# Patient Record
Sex: FEMALE | Race: WHITE | NOT HISPANIC OR LATINO | Employment: FULL TIME | URBAN - METROPOLITAN AREA
[De-identification: names, ages, dates, MRNs, and addresses within clinical notes are randomized per-mention and may not be internally consistent; named-entity substitution may affect disease eponyms.]

---

## 2018-06-20 ENCOUNTER — NON-PROVIDER VISIT (OUTPATIENT)
Dept: URGENT CARE | Facility: PHYSICIAN GROUP | Age: 23
End: 2018-06-20

## 2018-06-20 DIAGNOSIS — Z11.1 PPD SCREENING TEST: Primary | ICD-10-CM

## 2018-06-20 PROCEDURE — 86580 TB INTRADERMAL TEST: CPT | Performed by: INTERNAL MEDICINE

## 2018-06-22 ENCOUNTER — NON-PROVIDER VISIT (OUTPATIENT)
Dept: URGENT CARE | Facility: PHYSICIAN GROUP | Age: 23
End: 2018-06-22

## 2018-06-22 LAB — TB WHEAL 3D P 5 TU DIAM: 0 MM

## 2019-01-04 ENCOUNTER — TELEPHONE (OUTPATIENT)
Dept: SCHEDULING | Facility: IMAGING CENTER | Age: 24
End: 2019-01-04

## 2019-01-24 ENCOUNTER — OFFICE VISIT (OUTPATIENT)
Dept: MEDICAL GROUP | Facility: MEDICAL CENTER | Age: 24
End: 2019-01-24
Attending: FAMILY MEDICINE
Payer: COMMERCIAL

## 2019-01-24 VITALS
HEART RATE: 92 BPM | TEMPERATURE: 99.7 F | DIASTOLIC BLOOD PRESSURE: 80 MMHG | HEIGHT: 64 IN | WEIGHT: 191 LBS | RESPIRATION RATE: 16 BRPM | OXYGEN SATURATION: 99 % | SYSTOLIC BLOOD PRESSURE: 120 MMHG | BODY MASS INDEX: 32.61 KG/M2

## 2019-01-24 DIAGNOSIS — E28.2 PCOS (POLYCYSTIC OVARIAN SYNDROME): ICD-10-CM

## 2019-01-24 DIAGNOSIS — N80.9 ENDOMETRIOSIS: ICD-10-CM

## 2019-01-24 DIAGNOSIS — E66.9 OBESITY (BMI 30-39.9): ICD-10-CM

## 2019-01-24 DIAGNOSIS — J45.20 MILD INTERMITTENT ASTHMA WITHOUT COMPLICATION: ICD-10-CM

## 2019-01-24 DIAGNOSIS — Z23 NEED FOR VACCINATION: ICD-10-CM

## 2019-01-24 PROCEDURE — 99202 OFFICE O/P NEW SF 15 MIN: CPT | Mod: 25 | Performed by: FAMILY MEDICINE

## 2019-01-24 PROCEDURE — 99204 OFFICE O/P NEW MOD 45 MIN: CPT | Performed by: FAMILY MEDICINE

## 2019-01-24 PROCEDURE — 90686 IIV4 VACC NO PRSV 0.5 ML IM: CPT | Performed by: FAMILY MEDICINE

## 2019-01-24 RX ORDER — ALBUTEROL SULFATE 90 UG/1
2 AEROSOL, METERED RESPIRATORY (INHALATION) EVERY 6 HOURS PRN
Qty: 8.5 G | Refills: 2 | Status: SHIPPED | OUTPATIENT
Start: 2019-01-24

## 2019-01-24 RX ORDER — ALBUTEROL SULFATE 90 UG/1
2 AEROSOL, METERED RESPIRATORY (INHALATION) EVERY 6 HOURS PRN
COMMUNITY
End: 2019-01-24 | Stop reason: SDUPTHER

## 2019-01-24 ASSESSMENT — PATIENT HEALTH QUESTIONNAIRE - PHQ9: CLINICAL INTERPRETATION OF PHQ2 SCORE: 0

## 2019-01-24 ASSESSMENT — PAIN SCALES - GENERAL: PAINLEVEL: NO PAIN

## 2019-01-24 NOTE — PROGRESS NOTES
Chief Complaint   Patient presents with   • New Patient   • Contraception         HISTORY OF THE PRESENT ILLNESS: Patient is a 23 y.o. female. This pleasant patient is here today to establish care and discuss the following problems      PCOS (polycystic ovarian syndrome)  New patient presents with history of being diagnosed with PCO S via imaging at age 13.  She has not experienced recent issues with acne.  She is mildly obese but has recently lost 12 pounds through limiting portions.    Endometriosis  The patient presents with history of endometriosis diagnosed by Dr. Ramirez based on pelvic pain during intercourse and clinical exam.  She has not had any pelviscopy.  She was managed with Seasonique where she was advised to skip the placebo weeks and went 3 years with no.  Whatsoever.  She found that when she was doing that is significantly increased feelings of depression and anxiety and fatigue.  She has now been off the Seasonique for the past 3 years and doing much better.  She reports that occasionally she will have may be a down day perhaps once a month but no sustained issues with depression or anxiety.  She reports that she does have very heavy menses lasting about 7 days he is occur every 4 weeks.  For current contraception they are using the pullout method.  She has never been pregnant.    Obesity (BMI 30-39.9)  Patient reports that thyroid labs have been normal in the past.  She denies cold intolerance or unexplained hair loss currently  3.  Mild intermittent asthma   characterized by wheezing typically only if she has a severe respiratory infection.  No recent emergency room visits.  She denies daily cough or sputum production.  She does not smoke  Mild asthma-patient reports that his  Social history-single, lives with her long-term boyfriend.  Works as a independent  for [employer]  Allergies: Cecrossy [cefaclor]    Current Outpatient Prescriptions Ordered in [employer]   Medication Sig Dispense  Refill   • albuterol (VENTOLIN HFA) 108 (90 Base) MCG/ACT Aero Soln inhalation aerosol Inhale 2 Puffs by mouth every 6 hours as needed for Shortness of Breath. 8.5 g 2   • Levonorgest-Eth Estrad 91-Day (SEASONIQUE PO) Take 1 Cap by mouth every day.     • fluticasone (FLONASE) 50 MCG/ACT nasal spray Spray 1 Spray in nose every day.     • therapeutic multivitamin-minerals (THERAGRAN-M) TABS Take 1 Tab by mouth every day.       No current [facility]-ordered facility-administered medications on file.        Past Medical History:   Diagnosis Date   • Anesthesia     PONV   • Anxiety    • Asthma     MILD   • Endometriosis    • PCOS (polycystic ovarian syndrome)        Past Surgical History:   Procedure Laterality Date   • MAMMOPLASTY REDUCTION  1/7/2015    Performed by Heydi Dickson M.D. at [facility]   • OTHER ORTHOPEDIC SURGERY  2011    RIGHT KNEE SCOPE-plica   • OTHER  1997    T&A   • TONSILLECTOMY         Social History   Substance Use Topics   • Smoking status: Never Smoker   • Smokeless tobacco: Never Used   • Alcohol use Yes      Comment: 1x /week       Family Status   Relation Status   • Bro (Not Specified)   • MUnc (Not Specified)   • MGFa (Not Specified)   • PGFa (Not Specified)   • Neg Hx (Not Specified)     Family History   Problem Relation Age of Onset   • Cancer Brother         Testicular   • Heart Disease Maternal Uncle    • Diabetes Maternal Grandfather    • Heart Disease Maternal Grandfather    • Cancer Paternal Grandfather         Leukemia   • Stroke Neg Hx        Review of Systems   Constitutional: Negative for fever, chills, weight loss and malaise/fatigue.   HENT: Negative for ear pain, nosebleeds, congestion, sore throat and neck pain.    Eyes: Negative for blurred vision.   Respiratory: Negative for cough, sputum production, shortness of breath and wheezing.    Cardiovascular: Negative for chest pain, palpitations, orthopnea and leg swelling.   Gastrointestinal: Negative for heartburn,  "nausea, vomiting and abdominal pain.   Genitourinary: Negative for dysuria, urgency and frequency.   Musculoskeletal: Negative for myalgias, back pain and joint pain.   Skin: Negative for rash and itching.   Neurological: Negative for dizziness, tingling, tremors, sensory change, focal weakness and headaches.   Endo/Heme/Allergies: Does not bruise/bleed easily.   Psychiatric/Behavioral: Negative for depression, anxiety, or memory loss.            Exam: Blood pressure 120/80, pulse 92, temperature 37.6 °C (99.7 °F), temperature source Temporal, resp. rate 16, height 1.626 m (5' 4.02\"), weight 86.6 kg (191 lb), last menstrual period 01/03/2019, SpO2 99 %.  General: Normal appearing. No distress.  HEENT: Normocephalic. Eyes conjunctiva clear lids without ptosis, pupils equal and reactive to light accommodation, ears normal shape and contour, canals are clear bilaterally, tympanic membranes are benign, nasal mucosa benign, oropharynx is without erythema, edema or exudates.   Neck: Supple without JVD or bruit. Thyroid is not enlarged.  Pulmonary: Clear to ausculation.  Normal effort. No rales, ronchi, or wheezing.  Cardiovascular: Regular rate and rhythm without murmur. Carotid and radial pulses are intact and equal bilaterally.  Abdomen: Soft, nontender, nondistended. Normal bowel sounds. Liver and spleen are not palpable  Neurologic: Intact light touch and strength bilaterally,normal speech, no tremor, normal gait.   Lymph: No cervical, supraclavicular or axillary lymph nodes are palpable  Skin: Warm and dry.  No obvious lesions.  Musculoskeletal: Normal gait. No extremity cyanosis, clubbing, or edema.  Psych: Normal mood and affect. Alert and oriented x3. Judgment and insight is normal.    Please note that this dictation was created using voice recognition software. I have made every reasonable attempt to correct obvious errors, but I expect that there are errors of grammar and possibly content that I did not discover " before finalizing the note.      Assessment/Plan  1. Need for vaccination  Flu Quad Inj >3 Year Pre-Filled (Preservative Free)   2. PCOS (polycystic ovarian syndrome)  REFERRAL TO GYNECOLOGY   3. Endometriosis  REFERRAL TO GYNECOLOGY   4. Obesity (BMI 30-39.9)       Plan: 1.  Flu vaccine today  2.  GYN consult regarding PCO S, endometriosis, appropriate more effective contraception  3.  Patient is recommended to at least start using foam and condoms pending GYN eval  4.  Discussed limiting portions to achieve further weight loss  5.  Rx Ventolin inhaler

## 2019-01-24 NOTE — ASSESSMENT & PLAN NOTE
Patient reports that thyroid labs have been normal in the past.  She denies cold intolerance or unexplained hair loss currently

## 2019-01-24 NOTE — ASSESSMENT & PLAN NOTE
New patient presents with history of being diagnosed with PCO S via imaging at age 13.  She has not experienced recent issues with acne.  She is mildly obese but has recently lost 12 pounds through limiting portions.

## 2019-01-24 NOTE — ASSESSMENT & PLAN NOTE
The patient presents with history of endometriosis diagnosed by Dr. Ramirez based on pelvic pain during intercourse and clinical exam.  She has not had any pelviscopy.  She was managed with Seasonique where she was advised to skip the placebo weeks and went 3 years with no.  Whatsoever.  She found that when she was doing that is significantly increased feelings of depression and anxiety and fatigue.  She has now been off the Seasonique for the past 3 years and doing much better.  She reports that occasionally she will have may be a down day perhaps once a month but no sustained issues with depression or anxiety.  She reports that she does have very heavy menses lasting about 7 days he is occur every 4 weeks.  For current contraception they are using the pullout method.  She has never been pregnant.

## 2019-02-15 ENCOUNTER — GYNECOLOGY VISIT (OUTPATIENT)
Dept: OBGYN | Facility: CLINIC | Age: 24
End: 2019-02-15
Payer: COMMERCIAL

## 2019-02-15 ENCOUNTER — HOSPITAL ENCOUNTER (OUTPATIENT)
Facility: MEDICAL CENTER | Age: 24
End: 2019-02-15
Attending: NURSE PRACTITIONER
Payer: COMMERCIAL

## 2019-02-15 VITALS
DIASTOLIC BLOOD PRESSURE: 76 MMHG | WEIGHT: 191 LBS | BODY MASS INDEX: 31.82 KG/M2 | HEIGHT: 65 IN | SYSTOLIC BLOOD PRESSURE: 112 MMHG

## 2019-02-15 DIAGNOSIS — E28.2 PCOS (POLYCYSTIC OVARIAN SYNDROME): ICD-10-CM

## 2019-02-15 DIAGNOSIS — Z01.419 WELL WOMAN EXAM WITH ROUTINE GYNECOLOGICAL EXAM: ICD-10-CM

## 2019-02-15 PROCEDURE — 99204 OFFICE O/P NEW MOD 45 MIN: CPT | Performed by: NURSE PRACTITIONER

## 2019-02-15 PROCEDURE — 87491 CHLMYD TRACH DNA AMP PROBE: CPT

## 2019-02-15 PROCEDURE — 87591 N.GONORRHOEAE DNA AMP PROB: CPT

## 2019-02-15 PROCEDURE — 88175 CYTOPATH C/V AUTO FLUID REDO: CPT

## 2019-02-15 ASSESSMENT — ENCOUNTER SYMPTOMS
EYES NEGATIVE: 1
GASTROINTESTINAL NEGATIVE: 1
CARDIOVASCULAR NEGATIVE: 1
RESPIRATORY NEGATIVE: 1
NEUROLOGICAL NEGATIVE: 1
MUSCULOSKELETAL NEGATIVE: 1
HEADACHES: 0
PSYCHIATRIC NEGATIVE: 1
CONSTITUTIONAL NEGATIVE: 1

## 2019-02-15 NOTE — PROGRESS NOTES
Annual Exam & birth Control visit  LMP: 01/30/2019  Last pap: 4-5 yrs ago Normal per daksha  WT: 191 lb  BP: 112/76  Good # [phone]

## 2019-02-15 NOTE — PROGRESS NOTES
Subjective:      Francesca Bush is a 23 y.o. female who presents with Annual Exam (Birth control)            Francesca Bush is a 24 y/o y.o. female who presents for her Gynecologic Exam.         HPI Comments: Pt presents for well woman exam. Pt has heavy periods that last 5-7 days and are regular but she would like a BCM that will decrease/elimiate bleeding. LMP 1/30/19. Reports using pull out method as BCM. Reports she would like to try pills as this seems easiest for her at this time. Tried seasonique as a teen and did not like how it made her feel.     Review of Systems   Pertinent positives documented in HPI and all other systems reviewed & are negative    All PMH, PSH, allergies, social history and FH reviewed and updated today:  Reports past medical hx of asthma controlled by rescue inhaler every other month. Hx of PCOS not on medication and reports no recent cysts. Reports hx of   Reports hx of breast reduction at age 19, knee surgery at age 9 and tonsillectomy as adult.   Allergic to Ceclor  Reports one sexual partner currently, two male sexual partners in entire life. Reports no hx of STIs. Reports infrequent marijuana use. Reports no alcohol, cigarettes or other drugs.   Reports brother with hx of testicular cancer, grandmother with COPD, and grandfather with leukemia as an adult.   Medications: none       Objective:  Vital measurements:  112/76  191 pounds     Physical Exam:     Chaperone offered: declines     Nursing note and vitals reviewed.  Constitutional: She is oriented to person, place, and time. She appears well-developed and well-nourished. No distress.     HEENT:   Head: Normocephalic and atraumatic.   Right Ear: External ear normal.   Left Ear: External ear normal.   Nose: Nose normal.   Eyes: Conjunctivae and EOM are normal. Pupils are equal, round, and reactive to light. No scleral icterus.     Neck: Normal range of motion. Neck supple. No tracheal deviation present. No thyromegaly  present.     Pulmonary/Chest: Effort normal and breath sounds normal. No respiratory distress. She has no wheezes. She has no rales. She exhibits no tenderness.     Cardiovascular: Regular, rate and rhythm. No JVD.    Abdominal: Soft. Bowel sounds are normal. She exhibits no distension and no mass. No tenderness. She has no rebound and no guarding.     Breast:  Symmetrical, normal consistency without masses., No dimpling or skin changes, Normal nipples without discharge, No masses    Genitourinary:  Pelvic exam was performed with patient supine.  External genitalia with no abnormal pigmentation, labial fusion,rash, tenderness, lesion or injury to the labia bilaterally.  Vagina is moist with no lesions, foul discharge, erythema, tenderness or bleeding. No foreign body around the vagina or signs of injury.   Cervix exhibits no motion tenderness, no discharge and no friability.   Uterus is firm and midline not deviated, not enlarged, not fixed and not tender.  Right adnexum displays no mass, no tenderness and no fullness. Left adnexum displays no mass, no tenderness and no fullness.     Musculoskeletal: Normal range of motion. She exhibits no edema and no tenderness.     Lymphadenopathy: She has no cervical adenopathy.     Assessment:  Well woman exam with birth control consultation     Plan:  Pap smear w/ gc/ct cotesting  Pt desires to try OCPs; will attempt continuous method/slip placebo week to decrease bleeding   TVUS for endometriosis/PCOS dx; last US as teen with only clinical dx of endometriosis   Return for care in <3 months for BCM assessment and review of US  Call if any adverse affects of OCPs  Monthly SBE.  Counseling: breast self exam, use and side effects of OCPs and family planning choices  Encourage exercise and proper diet.  Mammograms starting @ age 40 annually.  See medications and orders placed in encounter report.              Review of Systems   Constitutional: Negative.    HENT: Negative.     Eyes: Negative.    Respiratory: Negative.    Cardiovascular: Negative.    Gastrointestinal: Negative.    Genitourinary: Negative.    Musculoskeletal: Negative.    Skin: Negative.    Neurological: Negative.  Negative for headaches.   Endo/Heme/Allergies: Negative.    Psychiatric/Behavioral: Negative.           Objective:     There were no vitals taken for this visit.     Physical Exam   Constitutional: She is oriented to person, place, and time. She appears well-developed and well-nourished.   HENT:   Head: Normocephalic and atraumatic.   Eyes: Pupils are equal, round, and reactive to light.   Neck: Normal range of motion. Neck supple. No thyromegaly present.   Cardiovascular: Normal rate and regular rhythm.    Pulmonary/Chest: Effort normal and breath sounds normal. Right breast exhibits no inverted nipple, no mass, no nipple discharge, no skin change and no tenderness. Left breast exhibits no inverted nipple, no mass, no nipple discharge, no skin change and no tenderness. Breasts are symmetrical. There is no breast swelling.   Abdominal: Soft.   Genitourinary: Vagina normal and uterus normal. No breast tenderness, discharge or bleeding. No labial fusion. There is no rash, tenderness, lesion or injury on the right labia. There is no rash, tenderness, lesion or injury on the left labia.   Musculoskeletal: Normal range of motion.   Neurological: She is alert and oriented to person, place, and time.   Skin: Skin is warm and dry.   Psychiatric: She has a normal mood and affect. Her behavior is normal. Judgment and thought content normal.               Assessment/Plan:     There are no diagnoses linked to this encounter.

## 2019-02-19 LAB
C TRACH DNA GENITAL QL NAA+PROBE: NEGATIVE
CYTOLOGY REG CYTOL: NORMAL
N GONORRHOEA DNA GENITAL QL NAA+PROBE: NEGATIVE
SPECIMEN SOURCE: NORMAL

## 2019-03-19 DIAGNOSIS — E28.2 PCOS (POLYCYSTIC OVARIAN SYNDROME): ICD-10-CM

## 2019-03-20 ENCOUNTER — HOSPITAL ENCOUNTER (OUTPATIENT)
Dept: RADIOLOGY | Facility: MEDICAL CENTER | Age: 24
End: 2019-03-20
Attending: NURSE PRACTITIONER
Payer: COMMERCIAL

## 2019-03-20 PROCEDURE — 76830 TRANSVAGINAL US NON-OB: CPT

## 2019-03-22 ENCOUNTER — GYNECOLOGY VISIT (OUTPATIENT)
Dept: OBGYN | Facility: CLINIC | Age: 24
End: 2019-03-22
Payer: COMMERCIAL

## 2019-03-22 VITALS
BODY MASS INDEX: 32.44 KG/M2 | HEIGHT: 64 IN | WEIGHT: 190 LBS | SYSTOLIC BLOOD PRESSURE: 112 MMHG | DIASTOLIC BLOOD PRESSURE: 64 MMHG

## 2019-03-22 DIAGNOSIS — Z30.41 ENCOUNTER FOR SURVEILLANCE OF CONTRACEPTIVE PILLS: ICD-10-CM

## 2019-03-22 PROBLEM — Z30.9 ENCOUNTER FOR BIRTH CONTROL: Status: ACTIVE | Noted: 2019-03-22

## 2019-03-22 PROCEDURE — 99212 OFFICE O/P EST SF 10 MIN: CPT | Performed by: NURSE PRACTITIONER

## 2019-03-22 NOTE — NON-PROVIDER
Pt here to discuss  Birth control and Ultrasound results  Pt states that she really likes the birth control   Pharmacy &#verified.

## 2019-03-22 NOTE — H&P
Pt presents for birth control f/u after 1 month of use. Reports is very happy with method. Reports some mild break through bleeding and some emotional ups and downs but generally very few side effects. Reports still being able to lose weight.   Would like to continue this method at this time  Also reviewed US with pt which is WNL, no indications of endometriosis and PCOS as pt was told she had in the past.   Also reviewed that pap smear and gc/ct was negative.     F/u in one year for STI testing and BCM refill/reassessment of functionality